# Patient Record
Sex: MALE | Race: WHITE | NOT HISPANIC OR LATINO
[De-identification: names, ages, dates, MRNs, and addresses within clinical notes are randomized per-mention and may not be internally consistent; named-entity substitution may affect disease eponyms.]

---

## 2018-08-31 ENCOUNTER — CHARTING TRANS (OUTPATIENT)
Dept: OTHER | Age: 23
End: 2018-08-31

## 2018-09-01 ENCOUNTER — CHARTING TRANS (OUTPATIENT)
Dept: OTHER | Age: 23
End: 2018-09-01

## 2018-09-14 ENCOUNTER — IMAGING SERVICES (OUTPATIENT)
Dept: OTHER | Age: 23
End: 2018-09-14

## 2018-09-17 ENCOUNTER — CHARTING TRANS (OUTPATIENT)
Dept: OTHER | Age: 23
End: 2018-09-17

## 2018-11-23 ENCOUNTER — IMAGING SERVICES (OUTPATIENT)
Dept: OTHER | Age: 23
End: 2018-11-23

## 2018-11-30 ENCOUNTER — HOSPITAL ENCOUNTER (EMERGENCY)
Age: 23
Discharge: HOME | End: 2018-11-30
Attending: EMERGENCY MEDICINE
Payer: COMMERCIAL

## 2018-11-30 ENCOUNTER — APPOINTMENT (OUTPATIENT)
Dept: GENERAL RADIOLOGY | Age: 23
End: 2018-11-30
Attending: PHYSICIAN ASSISTANT
Payer: COMMERCIAL

## 2018-11-30 VITALS
HEIGHT: 66 IN | BODY MASS INDEX: 36.16 KG/M2 | DIASTOLIC BLOOD PRESSURE: 87 MMHG | TEMPERATURE: 98 F | SYSTOLIC BLOOD PRESSURE: 126 MMHG | RESPIRATION RATE: 20 BRPM | HEART RATE: 87 BPM | WEIGHT: 225 LBS | OXYGEN SATURATION: 99 %

## 2018-11-30 DIAGNOSIS — R07.81 RIB PAIN ON RIGHT SIDE: Primary | ICD-10-CM

## 2018-11-30 PROCEDURE — 71101 X-RAY EXAM UNILAT RIBS/CHEST: CPT | Performed by: PHYSICIAN ASSISTANT

## 2018-11-30 PROCEDURE — 96372 THER/PROPH/DIAG INJ SC/IM: CPT

## 2018-11-30 PROCEDURE — 99284 EMERGENCY DEPT VISIT MOD MDM: CPT

## 2018-11-30 PROCEDURE — 99283 EMERGENCY DEPT VISIT LOW MDM: CPT

## 2018-11-30 RX ORDER — HYDROCODONE BITARTRATE AND ACETAMINOPHEN 5; 325 MG/1; MG/1
1-2 TABLET ORAL EVERY 4 HOURS PRN
Qty: 10 TABLET | Refills: 0 | Status: SHIPPED | OUTPATIENT
Start: 2018-11-30 | End: 2018-12-07

## 2018-11-30 RX ORDER — HYDROMORPHONE HYDROCHLORIDE 1 MG/ML
1 INJECTION, SOLUTION INTRAMUSCULAR; INTRAVENOUS; SUBCUTANEOUS ONCE
Status: COMPLETED | OUTPATIENT
Start: 2018-11-30 | End: 2018-11-30
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
## 2018-11-30 NOTE — ED PROVIDER NOTES
Patient Seen in: [facility] In [place]    History   Patient presents with:  Trauma (cardiovascular, musculoskeletal)  Back Pain (musculoskeletal)    Stated Complaint: RIGHT RIB PAIN AND BACK    42-year-old obese  male with a Musculoskeletal: Normal range of motion. Neurological: He is alert. Skin: Skin is warm. No rash noted. He is not diaphoretic. Psychiatric: He has a normal mood and affect.          ED Course   Labs Reviewed - No data to display  Xr Ribs With Chest (3

## 2024-08-06 ENCOUNTER — HOSPITAL ENCOUNTER (EMERGENCY)
Age: 29
Discharge: HOME | End: 2024-08-06
Attending: EMERGENCY MEDICINE
Payer: COMMERCIAL

## 2024-08-06 VITALS
BODY MASS INDEX: 36.16 KG/M2 | SYSTOLIC BLOOD PRESSURE: 106 MMHG | RESPIRATION RATE: 20 BRPM | WEIGHT: 225 LBS | DIASTOLIC BLOOD PRESSURE: 70 MMHG | HEART RATE: 65 BPM | OXYGEN SATURATION: 96 % | HEIGHT: 66 IN | TEMPERATURE: 97 F

## 2024-08-06 DIAGNOSIS — K60.2 ANAL FISSURE: Primary | ICD-10-CM

## 2024-08-06 PROCEDURE — 82272 OCCULT BLD FECES 1-3 TESTS: CPT

## 2024-08-06 PROCEDURE — 99283 EMERGENCY DEPT VISIT LOW MDM: CPT

## 2024-08-06 RX ORDER — NITROGLYCERIN 4 MG/G
1.5 OINTMENT RECTAL EVERY 12 HOURS
Qty: 30 G | Refills: 0 | Status: SHIPPED | OUTPATIENT
Start: 2024-08-06

## 2024-08-06 RX ORDER — DOCUSATE SODIUM 100 MG/1
100 CAPSULE, LIQUID FILLED ORAL 2 TIMES DAILY
Qty: 60 CAPSULE | Refills: 0 | Status: SHIPPED | OUTPATIENT
Start: 2024-08-06 | End: 2024-09-05

## 2024-08-06 RX ORDER — LIDOCAINE HYDROCHLORIDE 20 MG/ML
5 JELLY TOPICAL ONCE
Status: COMPLETED | OUTPATIENT
Start: 2024-08-06 | End: 2024-08-06

## 2024-08-06 RX ORDER — HYDROCORTISONE ACETATE 25 MG/1
25 SUPPOSITORY RECTAL 2 TIMES DAILY PRN
Qty: 12 SUPPOSITORY | Refills: 0 | Status: SHIPPED | OUTPATIENT
Start: 2024-08-06 | End: 2024-09-05

## 2024-08-06 RX ORDER — IBUPROFEN 600 MG/1
600 TABLET ORAL ONCE
Status: COMPLETED | OUTPATIENT
Start: 2024-08-06 | End: 2024-08-06

## 2024-08-06 RX ORDER — POLYETHYLENE GLYCOL 3350 17 G/17G
17 POWDER, FOR SOLUTION ORAL DAILY PRN
Qty: 12 EACH | Refills: 0 | Status: SHIPPED | OUTPATIENT
Start: 2024-08-06 | End: 2024-09-05

## 2024-08-06 NOTE — DISCHARGE INSTRUCTIONS
Take MiraLAX daily to keep the stools soft.  Drink plenty of fluids.    Try a sitz baths.  Use the nitroglycerin rectal ointment twice daily as directed.  You can use the cortisone suppositories  (Anusol HC) twice daily as directed.    You must follow with GI for further evaluation.  If you have new, changing or worsening symptoms return for reevaluation.    Keep stools very soft- colace and miralax - tucks witch hazel pads for wiping     Use a bidet attachment to clean rectum after having a BM

## 2024-08-06 NOTE — ED PROVIDER NOTES
Patient Seen in: [facility] In [place]      History     Chief Complaint   Patient presents with    Anal Problem     Stated Complaint: rectal pain after passing large stool    Subjective:   HPI    CHIEF COMPLAINT: Rectal pain     HISTORY OF PRESENT ILLNESS: Patient is a 29-year-old male presenting for evaluation of rectal pain.  Reports he had a large bowel movement yesterday and during the bowel movement had intense, sharp pain.  Pain has persisted over the last several hours.  He states he was unable to sleep due to pain.  He has tried ibuprofen without relief.  Has not seen any bleeding from the rectum.  States this happened 7 or 8 years ago and took a few weeks to heal.  He thought it was hemorrhoids but he never had it checked out.     REVIEW OF SYSTEMS:  Constitutional: no fever, no chills  Eyes: no discharge  ENT: no sore throat  Cardiovascular: no chest pain, no palpitations  Respiratory: no cough, no shortness of breath  Gastrointestinal: no abdominal pain, no vomiting  Genitourinary: no hematuria  Musculoskeletal: no back pain  Skin: no rashes  Neurological: no headache     Otherwise a complete review of systems was obtained and other than the HPI was negative     The patient's medication list, past medical history and social history elements is as listed in today's nurse's notes are reviewed and agree. The patient's family history is reviewed and is noncontributory to the presenting problem, except as indicated as above.    Objective:   History reviewed. No pertinent past medical history.           Past Surgical History:   Procedure Laterality Date    Fracture surgery      Shoulder arthroscopy                  Social History     Socioeconomic History    Marital status: Single   Tobacco Use    Smoking status: Former     Current packs/day: 0.50     Types: Cigarettes    Smokeless tobacco: Never   Vaping Use    Vaping status: Every Day              Review of Systems    Positive for stated  Chief Complaint: Anal Problem    Other systems are as noted in HPI.  Constitutional and vital signs reviewed.      All other systems reviewed and negative except as noted above.    Physical Exam     ED Triage Vitals [08/06/24 1046]   /73   Pulse 85   Resp 20   Temp 97.4 °F (36.3 °C)   Temp src Temporal   SpO2 96 %   O2 Device None (Room air)       Current Vitals:   Vital Signs  BP: 106/70  Pulse: 65  Resp: 20  Temp: 97.4 °F (36.3 °C)  Temp src: Temporal    Oxygen Therapy  SpO2: 96 %  O2 Device: None (Room air)            Physical Exam    Vital signs and nursing notes reviewed  General Appearance: No acute distress  Neurological:  A&Ox3,  Gait normal.  Psychiatric: calm and cooperative  Respiratory: Normal effort  Musculoskeletal: Extremities are symmetrical, full range of motion  Skin:  warm and dry, no rashes.   Abdomen: Soft, nontender/nondistended.  No guarding or rebound.  Rectal exam: No masses or lesions appreciated on the external exam.  No hemorrhoids visualized.  Rectal exam with normal sphincter tone.  No masses in the vault.  Brown stool, guaiac negative, controls negative x 2.  Lidocaine placed at the anus.    ED Course   Labs Reviewed - No data to display          Differential diagnosis is hemorrhoids, anal fissure, anal sphincter spasming         MDM      This is a well-appearing 29-year-old male presenting for evaluation of rectal pain after a large bowel movement yesterday.  No evidence of hemorrhoids on today's exam.  Likely an anal fissure.  Patient was very uncomfortable so topical lidocaine was placed.  Will discharge home with prescriptions for rectal nitroglycerin, Anusol HC suppositories, instructions to take MiraLAX and increase fluids daily.  Referral to GI.  If there are any new, changing or worsening symptoms go to the ER.  Patient voiced understanding to the treatment plan.  All questions answered.  This patient was seen and examined with Dr. Doll, who agrees with the disposition  and plan.                                   Medical Decision Making  Risk  OTC drugs.  Prescription drug management.        Disposition and Plan     Clinical Impression:  1. Anal fissure         Disposition:  Discharge  8/6/2024  1:42 pm    Follow-up:  Watson Guy MD  [address] [place] [address]  [phone]    Follow up            Medications Prescribed:  Discharge Medication List as of 8/6/2024  1:47 PM        START taking these medications    Details   nitroglycerin 0.4 % Rectal Ointment Place 1.5 g rectally every 12 (twelve) hours., Normal, Disp-30 g, R-0Dose 1 inch equals 1.5g      hydrocortisone 25 MG Rectal Suppos Place 1 suppository (25 mg total) rectally 2 (two) times daily as needed., Normal, Disp-12 suppository, R-0      docusate sodium 100 MG Oral Cap Take 1 capsule (100 mg total) by mouth 2 (two) times daily., Normal, Disp-60 capsule, R-0      polyethylene glycol, PEG 3350, 17 g Oral Powd Pack Take 17 g by mouth daily as needed., Normal, Disp-12 each, R-0

## (undated) NOTE — ED AVS SNAPSHOT
Sonido Ponce   MRN: [id number]    Department:  THE [facility] in [place]   Date of Visit:  11/30/2018           Disclosure     Insurance plans vary and the physician(s) referred by the ER may not be covered by your plan.  Please c tell this physician (or your personal doctor if your instructions are to return to your personal doctor) about any new or lasting problems. The primary care or specialist physician will see patients referred from the [facility] Emergency Department.  Salvadore Skiff

## (undated) NOTE — LETTER
Date & Time: 11/30/2018, 5:10 PM  Patient: Ti Campoverde  Encounter Provider(s):    Shasta Spurling, MD Naida Chess., PA       To Whom It May Concern:    Akil Mcdonough was seen and treated in our department on 11/30/2018.  He can return